# Patient Record
Sex: MALE | Race: WHITE | NOT HISPANIC OR LATINO | URBAN - METROPOLITAN AREA
[De-identification: names, ages, dates, MRNs, and addresses within clinical notes are randomized per-mention and may not be internally consistent; named-entity substitution may affect disease eponyms.]

---

## 2020-07-25 ENCOUNTER — TELEPHONE ENCOUNTER (OUTPATIENT)
Dept: URBAN - METROPOLITAN AREA CLINIC 13 | Facility: CLINIC | Age: 60
End: 2020-07-25

## 2020-07-26 ENCOUNTER — TELEPHONE ENCOUNTER (OUTPATIENT)
Dept: URBAN - METROPOLITAN AREA CLINIC 13 | Facility: CLINIC | Age: 60
End: 2020-07-26

## 2020-07-26 RX ORDER — MULTIVITAMIN
TAKE 1 TABLET DAILY TABLET ORAL
Refills: 0 | Status: ACTIVE | COMMUNITY

## 2020-07-26 RX ORDER — TAMSULOSIN HYDROCHLORIDE 0.4 MG/1
TAKE 1 CAPSULE DAILY CAPSULE ORAL
Refills: 0 | Status: ACTIVE | COMMUNITY

## 2020-07-26 RX ORDER — LISINOPRIL 20 MG/1
TAKE 1 TABLET DAILY TABLET ORAL
Refills: 0 | Status: ACTIVE | COMMUNITY

## 2020-07-26 RX ORDER — CARVEDILOL 6.25 MG/1
1/2 TABLET TWICE DAILY TABLET, FILM COATED ORAL
Refills: 0 | Status: ACTIVE | COMMUNITY

## 2020-07-26 RX ORDER — SPIRONOLACTONE 25 MG/1
TAKE 1 TABLET DAILY TABLET ORAL
Refills: 0 | Status: ACTIVE | COMMUNITY

## 2020-11-23 ENCOUNTER — TELEPHONE ENCOUNTER (OUTPATIENT)
Dept: URBAN - METROPOLITAN AREA CLINIC 113 | Facility: CLINIC | Age: 60
End: 2020-11-23

## 2020-11-23 ENCOUNTER — LAB OUTSIDE AN ENCOUNTER (OUTPATIENT)
Dept: URBAN - METROPOLITAN AREA CLINIC 113 | Facility: CLINIC | Age: 60
End: 2020-11-23

## 2020-11-23 ENCOUNTER — CLAIMS CREATED FROM THE CLAIM WINDOW (OUTPATIENT)
Dept: URBAN - METROPOLITAN AREA MEDICAL CENTER 2 | Facility: MEDICAL CENTER | Age: 60
End: 2020-11-23
Payer: COMMERCIAL

## 2020-11-23 DIAGNOSIS — K92.1 MELENA: ICD-10-CM

## 2020-11-23 DIAGNOSIS — K26.9 DUODENAL ULCER: ICD-10-CM

## 2020-11-23 DIAGNOSIS — D50.0 IRON DEFICIENCY ANEMIA SECONDARY TO BLOOD LOSS (CHRONIC): ICD-10-CM

## 2020-11-23 DIAGNOSIS — K29.50 CHRONIC GASTRITIS: ICD-10-CM

## 2020-11-23 PROCEDURE — 99253 IP/OBS CNSLTJ NEW/EST LOW 45: CPT | Performed by: INTERNAL MEDICINE

## 2020-11-23 PROCEDURE — 43239 EGD BIOPSY SINGLE/MULTIPLE: CPT | Performed by: INTERNAL MEDICINE

## 2020-11-24 ENCOUNTER — CLAIMS CREATED FROM THE CLAIM WINDOW (OUTPATIENT)
Dept: URBAN - METROPOLITAN AREA MEDICAL CENTER 2 | Facility: MEDICAL CENTER | Age: 60
End: 2020-11-24
Payer: COMMERCIAL

## 2020-11-24 DIAGNOSIS — D50.0 IRON DEFICIENCY ANEMIA SECONDARY TO BLOOD LOSS (CHRONIC): ICD-10-CM

## 2020-11-24 DIAGNOSIS — K92.1 MELENA: ICD-10-CM

## 2020-11-24 DIAGNOSIS — K25.9 ANTRAL ULCER: ICD-10-CM

## 2020-11-24 PROCEDURE — 99232 SBSQ HOSP IP/OBS MODERATE 35: CPT | Performed by: INTERNAL MEDICINE

## 2020-12-09 ENCOUNTER — CLAIMS CREATED FROM THE CLAIM WINDOW (OUTPATIENT)
Dept: URBAN - METROPOLITAN AREA MEDICAL CENTER 2 | Facility: MEDICAL CENTER | Age: 60
End: 2020-12-09
Payer: COMMERCIAL

## 2020-12-09 DIAGNOSIS — R11.2 NAUSEA WITH VOMITING, UNSPECIFIED: ICD-10-CM

## 2020-12-09 DIAGNOSIS — K25.9 GASTRIC ULCER, UNSPECIFIED AS ACUTE OR CHRONIC, WITHOUT HEMORRHAGE OR PERFORATION: ICD-10-CM

## 2020-12-09 DIAGNOSIS — R19.7 DIARRHEA, UNSPECIFIED: ICD-10-CM

## 2020-12-09 PROCEDURE — 99253 IP/OBS CNSLTJ NEW/EST LOW 45: CPT | Performed by: INTERNAL MEDICINE

## 2020-12-10 ENCOUNTER — CLAIMS CREATED FROM THE CLAIM WINDOW (OUTPATIENT)
Dept: URBAN - METROPOLITAN AREA MEDICAL CENTER 2 | Facility: MEDICAL CENTER | Age: 60
End: 2020-12-10
Payer: COMMERCIAL

## 2020-12-10 DIAGNOSIS — R11.2 NAUSEA WITH VOMITING, UNSPECIFIED: ICD-10-CM

## 2020-12-10 DIAGNOSIS — K52.9 NONINFECTIVE GASTROENTERITIS AND COLITIS, UNSPECIFIED: ICD-10-CM

## 2020-12-10 DIAGNOSIS — K25.9 GASTRIC ULCER, UNSPECIFIED AS ACUTE OR CHRONIC, WITHOUT HEMORRHAGE OR PERFORATION: ICD-10-CM

## 2020-12-10 DIAGNOSIS — R19.7 DIARRHEA, UNSPECIFIED: ICD-10-CM

## 2020-12-10 PROCEDURE — 99232 SBSQ HOSP IP/OBS MODERATE 35: CPT | Performed by: INTERNAL MEDICINE

## 2020-12-11 ENCOUNTER — CLAIMS CREATED FROM THE CLAIM WINDOW (OUTPATIENT)
Dept: URBAN - METROPOLITAN AREA MEDICAL CENTER 2 | Facility: MEDICAL CENTER | Age: 60
End: 2020-12-11
Payer: COMMERCIAL

## 2020-12-11 ENCOUNTER — TELEPHONE ENCOUNTER (OUTPATIENT)
Dept: URBAN - METROPOLITAN AREA CLINIC 113 | Facility: CLINIC | Age: 60
End: 2020-12-11

## 2020-12-11 DIAGNOSIS — R11.2 NAUSEA WITH VOMITING, UNSPECIFIED: ICD-10-CM

## 2020-12-11 DIAGNOSIS — K52.9 NONINFECTIVE GASTROENTERITIS AND COLITIS, UNSPECIFIED: ICD-10-CM

## 2020-12-11 DIAGNOSIS — R19.7 DIARRHEA, UNSPECIFIED: ICD-10-CM

## 2020-12-11 DIAGNOSIS — K25.9 GASTRIC ULCER, UNSPECIFIED AS ACUTE OR CHRONIC, WITHOUT HEMORRHAGE OR PERFORATION: ICD-10-CM

## 2020-12-11 PROCEDURE — 99232 SBSQ HOSP IP/OBS MODERATE 35: CPT | Performed by: INTERNAL MEDICINE

## 2020-12-21 ENCOUNTER — OFFICE VISIT (OUTPATIENT)
Dept: URBAN - METROPOLITAN AREA CLINIC 113 | Facility: CLINIC | Age: 60
End: 2020-12-21

## 2020-12-21 RX ORDER — LISINOPRIL 20 MG/1
TAKE 1 TABLET DAILY TABLET ORAL
Refills: 0 | Status: ACTIVE | COMMUNITY

## 2020-12-21 RX ORDER — CARVEDILOL 6.25 MG/1
1/2 TABLET TWICE DAILY TABLET, FILM COATED ORAL
Refills: 0 | Status: ACTIVE | COMMUNITY

## 2020-12-21 RX ORDER — TAMSULOSIN HYDROCHLORIDE 0.4 MG/1
TAKE 1 CAPSULE DAILY CAPSULE ORAL
Refills: 0 | Status: ACTIVE | COMMUNITY

## 2020-12-21 RX ORDER — SPIRONOLACTONE 25 MG/1
TAKE 1 TABLET DAILY TABLET ORAL
Refills: 0 | Status: ACTIVE | COMMUNITY

## 2020-12-21 RX ORDER — MULTIVITAMIN
TAKE 1 TABLET DAILY TABLET ORAL
Refills: 0 | Status: ACTIVE | COMMUNITY

## 2020-12-24 ENCOUNTER — CLAIMS CREATED FROM THE CLAIM WINDOW (OUTPATIENT)
Dept: URBAN - METROPOLITAN AREA MEDICAL CENTER 2 | Facility: MEDICAL CENTER | Age: 60
End: 2020-12-24
Payer: COMMERCIAL

## 2020-12-24 DIAGNOSIS — K57.33 DIVERTICULITIS OF LARGE INTESTINE WITHOUT PERFORATION OR ABSCESS WITH BLEEDING: ICD-10-CM

## 2020-12-24 PROCEDURE — 99254 IP/OBS CNSLTJ NEW/EST MOD 60: CPT | Performed by: INTERNAL MEDICINE

## 2020-12-25 ENCOUNTER — CLAIMS CREATED FROM THE CLAIM WINDOW (OUTPATIENT)
Dept: URBAN - METROPOLITAN AREA MEDICAL CENTER 2 | Facility: MEDICAL CENTER | Age: 60
End: 2020-12-25
Payer: COMMERCIAL

## 2020-12-25 DIAGNOSIS — K57.33 DIVERTICULITIS OF LARGE INTESTINE WITHOUT PERFORATION OR ABSCESS WITH BLEEDING: ICD-10-CM

## 2020-12-25 PROCEDURE — 99232 SBSQ HOSP IP/OBS MODERATE 35: CPT | Performed by: INTERNAL MEDICINE

## 2020-12-28 ENCOUNTER — WEB ENCOUNTER (OUTPATIENT)
Dept: URBAN - METROPOLITAN AREA CLINIC 113 | Facility: CLINIC | Age: 60
End: 2020-12-28

## 2020-12-28 ENCOUNTER — OFFICE VISIT (OUTPATIENT)
Dept: URBAN - METROPOLITAN AREA CLINIC 113 | Facility: CLINIC | Age: 60
End: 2020-12-28
Payer: COMMERCIAL

## 2020-12-28 VITALS
SYSTOLIC BLOOD PRESSURE: 130 MMHG | HEART RATE: 74 BPM | HEIGHT: 73 IN | TEMPERATURE: 99.1 F | DIASTOLIC BLOOD PRESSURE: 77 MMHG | BODY MASS INDEX: 32.34 KG/M2 | WEIGHT: 244 LBS

## 2020-12-28 DIAGNOSIS — R10.32 LEFT LOWER QUADRANT ABDOMINAL PAIN: ICD-10-CM

## 2020-12-28 DIAGNOSIS — K25.3 ACUTE GASTRIC ULCER WITHOUT HEMORRHAGE OR PERFORATION: ICD-10-CM

## 2020-12-28 DIAGNOSIS — K57.32 DIVERTICULITIS OF LARGE INTESTINE, UNSPECIFIED BLEEDING STATUS, UNSPECIFIED COMPLICATION STATUS: ICD-10-CM

## 2020-12-28 DIAGNOSIS — R93.5 ABNORMAL CT OF THE ABDOMEN: ICD-10-CM

## 2020-12-28 PROCEDURE — 99214 OFFICE O/P EST MOD 30 MIN: CPT | Performed by: NURSE PRACTITIONER

## 2020-12-28 PROCEDURE — G8427 DOCREV CUR MEDS BY ELIG CLIN: HCPCS | Performed by: NURSE PRACTITIONER

## 2020-12-28 PROCEDURE — G9903 PT SCRN TBCO ID AS NON USER: HCPCS | Performed by: NURSE PRACTITIONER

## 2020-12-28 PROCEDURE — G8482 FLU IMMUNIZE ORDER/ADMIN: HCPCS | Performed by: NURSE PRACTITIONER

## 2020-12-28 RX ORDER — CIPROFLOXACIN 500 MG/5ML
5 ML KIT ORAL ONCE DAILY
Status: ACTIVE | COMMUNITY

## 2020-12-28 RX ORDER — ROPINIROLE 1 MG/1
1 TABLET TABLET, FILM COATED ORAL ONCE A DAY
Status: ACTIVE | COMMUNITY

## 2020-12-28 RX ORDER — PANTOPRAZOLE SODIUM 40 MG/1
1 TABLET TABLET, DELAYED RELEASE ORAL TWICE A DAY
Status: ACTIVE | COMMUNITY

## 2020-12-28 RX ORDER — CARIPRAZINE 1.5 MG/1
1 CAPSULE CAPSULE, GELATIN COATED ORAL ONCE A DAY
Status: ACTIVE | COMMUNITY

## 2020-12-28 RX ORDER — AMLODIPINE BESYLATE 5 MG/1
1 TABLET TABLET ORAL ONCE A DAY
Status: ACTIVE | COMMUNITY

## 2020-12-28 RX ORDER — LISINOPRIL 20 MG/1
TAKE 1 TABLET DAILY TABLET ORAL
Refills: 0 | Status: ACTIVE | COMMUNITY

## 2020-12-28 RX ORDER — CARVEDILOL 6.25 MG/1
1/2 TABLET TWICE DAILY TABLET, FILM COATED ORAL
Refills: 0 | Status: ACTIVE | COMMUNITY

## 2020-12-28 RX ORDER — BUSPIRONE HYDROCHLORIDE 7.5 MG/1
1 TABLET TABLET ORAL TWICE A DAY
Status: ACTIVE | COMMUNITY

## 2020-12-28 RX ORDER — AMOXICILLIN 875 MG/1
1 TABLET TABLET, FILM COATED ORAL ONCE DAILY
Status: ACTIVE | COMMUNITY

## 2020-12-28 RX ORDER — ROPINIROLE 2 MG/1
1 TABLET TABLET, FILM COATED ORAL ONCE A DAY
Status: ACTIVE | COMMUNITY

## 2020-12-28 RX ORDER — METRONIDAZOLE 500 MG/1
1 TABLET TABLET, FILM COATED ORAL ONCE DAILY
Status: ACTIVE | COMMUNITY

## 2020-12-28 NOTE — HPI-OTHER HISTORIES
Labs 12/10/2020 BMP normal with exception of BUN 28, glucose 133, calcium 8.2.  LFTs: TB 0.8, ALP 37, ALT 19, AST 33.  CBC: WBC 5.78, hemoglobin 11.5, MCV 97, platelet 144.  12/9/2020 CBC: WBC 8.8, hemoglobin 13.6, MCV 94.8, platelet 137.  BMP normal with exception of BUN 26, glucose 126.  LFTs normal.  Lipase 27.  Stool for C. difficile negative. CT of the abdomen and pelvis with contrast 12/9/2020 : Moderate diffuse thickening of the colon suggesting colitis; endoscopic correlation if indicated.  Mild perihepatic free fluid.  Hepatic steatosis, and cholelithiasis.  Mild prostatomegaly.  Lumbar spondylosis.   EGD 11/23/2020: Multiple nonbleeding 2 to 6 mm cratered antral and prepyloric gastric ulcers with no stigmata of bleeding status post biopsy, moderate erosive D1 and D2 duodenitis, small amount of retained gastric contents consistent with gastroparesis, EGD otherwise normal to D3.  Pathology: Gastric antral biopsies demonstrated antral gastric mucosa showing sparse chronic inflammation.  Negative for H. pylori.   Colonoscopy 3/20/2018: BBPS 6, sigmoid/descending/transverse diverticulosis, otherwise normal.  Screening recommended in 2028.

## 2020-12-28 NOTE — PHYSICAL EXAM GASTROINTESTINAL
Abdomen , soft, tender LLQ, nondistended , no guarding or rigidity , no masses palpable , normal bowel sounds , Liver and Spleen , no hepatosplenomegaly

## 2020-12-28 NOTE — HPI-TODAY'S VISIT:
This is a 60-year-old male with a history of nonischemic cardiomyopathy status post defibrillator placement, sleep apnea, atrial fibrillation, depression/anxiety, hypertension, and degenerative joint disease presenting for hospital follow-up.  He was seen in hospital consultation 12/9/2020 for diarrhea.  He had been seen 2 weeks earlier in hospital consultation with melena and had undergone an EGD demonstrating gastric ulcers.  He had been prescribed PPI.  He had done well until the day before admission when he developed acute onset of profound diarrhea with severe nausea and vomiting.  Labs demonstrated a slightly low platelet count, normal hemoglobin.  Stool for C. difficile was pending.  It was discussed that he likely had a foodborne illness.  Stool for C. difficile was negative.  CT was notable for diffusely thickened colon suggesting colitis. He reports bright red blood per rectum for which he was readmitted to the hospital 12/20/2020.  He was seen by Dr. Hsu.  He was diagnosed with diverticulitis by CT scan.  He was given IV antibiotics and discharged from the hospital 12/25 (records from this hospitalization are not available).  When he was discharged from the hospital on 12/13 after the episode of colitis, he had been taking Cipro and Flagyl.  He resumed this following hospitalization and is also been taking Augmentin 875 mg twice daily.  These medications were initially prescribed for 10 days.  He has 5 or 6 days left of Cipro.  He is having 2 or 3 small bowel movements per day which is his baseline.  He reports slight, intermittent residual pain in the left lower quadrant.  He denies any other abdominal symptoms.  He reports a poor appetite.  He continues to experience an intermittent low-grade temperature.  He is complaining of fatigue and lack of energy.  He denies dyspnea or chest pain.  He has follow-up with his cardiologist on 1/4/2020.  He is compliant with twice daily pantoprazole.

## 2021-01-06 ENCOUNTER — TELEPHONE ENCOUNTER (OUTPATIENT)
Dept: URBAN - METROPOLITAN AREA CLINIC 113 | Facility: CLINIC | Age: 61
End: 2021-01-06

## 2021-01-11 ENCOUNTER — TELEPHONE ENCOUNTER (OUTPATIENT)
Dept: URBAN - METROPOLITAN AREA CLINIC 113 | Facility: CLINIC | Age: 61
End: 2021-01-11

## 2021-01-14 ENCOUNTER — OFFICE VISIT (OUTPATIENT)
Dept: URBAN - METROPOLITAN AREA MEDICAL CENTER 2 | Facility: MEDICAL CENTER | Age: 61
End: 2021-01-14
Payer: COMMERCIAL

## 2021-01-14 DIAGNOSIS — K29.50 ANTRAL GASTRITIS: ICD-10-CM

## 2021-01-14 DIAGNOSIS — K25.3 ACUTE BENIGN GASTRIC ULCER: ICD-10-CM

## 2021-01-14 PROCEDURE — 43239 EGD BIOPSY SINGLE/MULTIPLE: CPT | Performed by: INTERNAL MEDICINE

## 2021-01-14 RX ORDER — ROPINIROLE 2 MG/1
1 TABLET TABLET, FILM COATED ORAL ONCE A DAY
Status: ACTIVE | COMMUNITY

## 2021-01-14 RX ORDER — PANTOPRAZOLE SODIUM 40 MG/1
1 TABLET TABLET, DELAYED RELEASE ORAL TWICE A DAY
Status: ACTIVE | COMMUNITY

## 2021-01-14 RX ORDER — AMOXICILLIN 875 MG/1
1 TABLET TABLET, FILM COATED ORAL ONCE DAILY
Status: ACTIVE | COMMUNITY

## 2021-01-14 RX ORDER — BUSPIRONE HYDROCHLORIDE 7.5 MG/1
1 TABLET TABLET ORAL TWICE A DAY
Status: ACTIVE | COMMUNITY

## 2021-01-14 RX ORDER — METRONIDAZOLE 500 MG/1
1 TABLET TABLET, FILM COATED ORAL ONCE DAILY
Status: ACTIVE | COMMUNITY

## 2021-01-14 RX ORDER — LISINOPRIL 20 MG/1
TAKE 1 TABLET DAILY TABLET ORAL
Refills: 0 | Status: ACTIVE | COMMUNITY

## 2021-01-14 RX ORDER — CIPROFLOXACIN 500 MG/5ML
5 ML KIT ORAL ONCE DAILY
Status: ACTIVE | COMMUNITY

## 2021-01-14 RX ORDER — CARIPRAZINE 1.5 MG/1
1 CAPSULE CAPSULE, GELATIN COATED ORAL ONCE A DAY
Status: ACTIVE | COMMUNITY

## 2021-01-14 RX ORDER — CARVEDILOL 6.25 MG/1
1/2 TABLET TWICE DAILY TABLET, FILM COATED ORAL
Refills: 0 | Status: ACTIVE | COMMUNITY

## 2021-01-14 RX ORDER — AMLODIPINE BESYLATE 5 MG/1
1 TABLET TABLET ORAL ONCE A DAY
Status: ACTIVE | COMMUNITY

## 2021-01-14 RX ORDER — ROPINIROLE 1 MG/1
1 TABLET TABLET, FILM COATED ORAL ONCE A DAY
Status: ACTIVE | COMMUNITY

## 2021-01-21 ENCOUNTER — TELEPHONE ENCOUNTER (OUTPATIENT)
Dept: URBAN - METROPOLITAN AREA CLINIC 113 | Facility: CLINIC | Age: 61
End: 2021-01-21

## 2021-01-21 ENCOUNTER — LAB OUTSIDE AN ENCOUNTER (OUTPATIENT)
Dept: URBAN - METROPOLITAN AREA CLINIC 113 | Facility: CLINIC | Age: 61
End: 2021-01-21

## 2021-01-21 RX ORDER — SODIUM, POTASSIUM,MAG SULFATES 17.5-3.13G
354ML SOLUTION, RECONSTITUTED, ORAL ORAL
Qty: 354 MILLILITER | Refills: 0 | OUTPATIENT
Start: 2021-01-21 | End: 2021-01-22

## 2021-01-22 ENCOUNTER — OFFICE VISIT (OUTPATIENT)
Dept: URBAN - METROPOLITAN AREA CLINIC 113 | Facility: CLINIC | Age: 61
End: 2021-01-22

## 2021-01-28 ENCOUNTER — TELEPHONE ENCOUNTER (OUTPATIENT)
Dept: URBAN - METROPOLITAN AREA CLINIC 113 | Facility: CLINIC | Age: 61
End: 2021-01-28

## 2021-01-29 ENCOUNTER — OFFICE VISIT (OUTPATIENT)
Dept: URBAN - METROPOLITAN AREA CLINIC 113 | Facility: CLINIC | Age: 61
End: 2021-01-29

## 2021-02-01 ENCOUNTER — OFFICE VISIT (OUTPATIENT)
Dept: URBAN - METROPOLITAN AREA MEDICAL CENTER 2 | Facility: MEDICAL CENTER | Age: 61
End: 2021-02-01
Payer: COMMERCIAL

## 2021-02-01 ENCOUNTER — OFFICE VISIT (OUTPATIENT)
Dept: URBAN - METROPOLITAN AREA CLINIC 113 | Facility: CLINIC | Age: 61
End: 2021-02-01

## 2021-02-01 DIAGNOSIS — K57.32 DIVERTICULITIS LARGE INTESTINE: ICD-10-CM

## 2021-02-01 DIAGNOSIS — K63.3 APHTHOUS ULCER OF COLON: ICD-10-CM

## 2021-02-01 DIAGNOSIS — K57.30 ACQUIRED DIVERTICULOSIS OF COLON: ICD-10-CM

## 2021-02-01 DIAGNOSIS — K62.5 ANAL BLEEDING: ICD-10-CM

## 2021-02-01 PROCEDURE — 45380 COLONOSCOPY AND BIOPSY: CPT | Performed by: INTERNAL MEDICINE

## 2021-02-01 RX ORDER — METRONIDAZOLE 500 MG/1
1 TABLET TABLET, FILM COATED ORAL ONCE DAILY
Status: ACTIVE | COMMUNITY

## 2021-02-01 RX ORDER — CIPROFLOXACIN 500 MG/5ML
5 ML KIT ORAL ONCE DAILY
Status: ACTIVE | COMMUNITY

## 2021-02-01 RX ORDER — CARIPRAZINE 1.5 MG/1
1 CAPSULE CAPSULE, GELATIN COATED ORAL ONCE A DAY
Status: ACTIVE | COMMUNITY

## 2021-02-01 RX ORDER — AMOXICILLIN 875 MG/1
1 TABLET TABLET, FILM COATED ORAL ONCE DAILY
Status: ACTIVE | COMMUNITY

## 2021-02-01 RX ORDER — CARVEDILOL 6.25 MG/1
1/2 TABLET TWICE DAILY TABLET, FILM COATED ORAL
Refills: 0 | Status: ACTIVE | COMMUNITY

## 2021-02-01 RX ORDER — ROPINIROLE 2 MG/1
1 TABLET TABLET, FILM COATED ORAL ONCE A DAY
Status: ACTIVE | COMMUNITY

## 2021-02-01 RX ORDER — ROPINIROLE 1 MG/1
1 TABLET TABLET, FILM COATED ORAL ONCE A DAY
Status: ACTIVE | COMMUNITY

## 2021-02-01 RX ORDER — BUSPIRONE HYDROCHLORIDE 7.5 MG/1
1 TABLET TABLET ORAL TWICE A DAY
Status: ACTIVE | COMMUNITY

## 2021-02-01 RX ORDER — LISINOPRIL 20 MG/1
TAKE 1 TABLET DAILY TABLET ORAL
Refills: 0 | Status: ACTIVE | COMMUNITY

## 2021-02-01 RX ORDER — PANTOPRAZOLE SODIUM 40 MG/1
1 TABLET TABLET, DELAYED RELEASE ORAL TWICE A DAY
Status: ACTIVE | COMMUNITY

## 2021-02-01 RX ORDER — AMLODIPINE BESYLATE 5 MG/1
1 TABLET TABLET ORAL ONCE A DAY
Status: ACTIVE | COMMUNITY

## 2021-02-22 ENCOUNTER — OFFICE VISIT (OUTPATIENT)
Dept: URBAN - METROPOLITAN AREA CLINIC 107 | Facility: CLINIC | Age: 61
End: 2021-02-22

## 2021-02-22 ENCOUNTER — WEB ENCOUNTER (OUTPATIENT)
Dept: URBAN - METROPOLITAN AREA CLINIC 107 | Facility: CLINIC | Age: 61
End: 2021-02-22

## 2021-02-22 ENCOUNTER — DASHBOARD ENCOUNTERS (OUTPATIENT)
Age: 61
End: 2021-02-22

## 2021-02-22 ENCOUNTER — OFFICE VISIT (OUTPATIENT)
Dept: URBAN - METROPOLITAN AREA CLINIC 107 | Facility: CLINIC | Age: 61
End: 2021-02-22
Payer: COMMERCIAL

## 2021-02-22 VITALS
HEART RATE: 70 BPM | DIASTOLIC BLOOD PRESSURE: 73 MMHG | SYSTOLIC BLOOD PRESSURE: 134 MMHG | TEMPERATURE: 98.2 F | BODY MASS INDEX: 33.27 KG/M2 | RESPIRATION RATE: 18 BRPM | HEIGHT: 73 IN | WEIGHT: 251 LBS

## 2021-02-22 DIAGNOSIS — K25.3 ACUTE GASTRIC ULCER WITHOUT HEMORRHAGE OR PERFORATION: ICD-10-CM

## 2021-02-22 DIAGNOSIS — K57.32 DIVERTICULITIS OF LARGE INTESTINE, UNSPECIFIED BLEEDING STATUS, UNSPECIFIED COMPLICATION STATUS: ICD-10-CM

## 2021-02-22 DIAGNOSIS — K25.7 CHRONIC GASTRIC ULCER WITHOUT HEMORRHAGE AND WITHOUT PERFORATION: ICD-10-CM

## 2021-02-22 DIAGNOSIS — R10.32 LEFT LOWER QUADRANT ABDOMINAL PAIN: ICD-10-CM

## 2021-02-22 DIAGNOSIS — R93.5 ABNORMAL CT OF THE ABDOMEN: ICD-10-CM

## 2021-02-22 DIAGNOSIS — K57.92 DIVERTICULITIS: ICD-10-CM

## 2021-02-22 PROBLEM — 15634181000119107: Status: ACTIVE | Noted: 2020-12-28

## 2021-02-22 PROBLEM — 67964002: Status: ACTIVE | Noted: 2020-12-28

## 2021-02-22 PROBLEM — 4494009: Status: ACTIVE | Noted: 2020-12-28

## 2021-02-22 PROBLEM — 307496006 DIVERTICULITIS: Status: ACTIVE | Noted: 2021-01-21

## 2021-02-22 PROBLEM — 76796008: Status: ACTIVE | Noted: 2020-11-23

## 2021-02-22 PROBLEM — 301716002: Status: ACTIVE | Noted: 2020-12-28

## 2021-02-22 PROCEDURE — 99213 OFFICE O/P EST LOW 20 MIN: CPT | Performed by: NURSE PRACTITIONER

## 2021-02-22 RX ORDER — BUSPIRONE HYDROCHLORIDE 7.5 MG/1
1 TABLET TABLET ORAL TWICE A DAY
Status: ACTIVE | COMMUNITY

## 2021-02-22 RX ORDER — LISINOPRIL 20 MG/1
TAKE 1 TABLET DAILY TABLET ORAL
Refills: 0 | Status: ACTIVE | COMMUNITY

## 2021-02-22 RX ORDER — CARVEDILOL 6.25 MG/1
1/2 TABLET TWICE DAILY TABLET, FILM COATED ORAL
Refills: 0 | Status: ACTIVE | COMMUNITY

## 2021-02-22 RX ORDER — METRONIDAZOLE 500 MG/1
1 TABLET TABLET, FILM COATED ORAL ONCE DAILY
Status: ACTIVE | COMMUNITY

## 2021-02-22 RX ORDER — AMLODIPINE BESYLATE 5 MG/1
1 TABLET TABLET ORAL ONCE A DAY
Status: ACTIVE | COMMUNITY

## 2021-02-22 RX ORDER — PANTOPRAZOLE SODIUM 40 MG/1
1 TABLET TABLET, DELAYED RELEASE ORAL TWICE A DAY
OUTPATIENT

## 2021-02-22 RX ORDER — AMOXICILLIN 875 MG/1
1 TABLET TABLET, FILM COATED ORAL ONCE DAILY
Status: ACTIVE | COMMUNITY

## 2021-02-22 RX ORDER — ROPINIROLE 1 MG/1
1 TABLET TABLET, FILM COATED ORAL ONCE A DAY
Status: ACTIVE | COMMUNITY

## 2021-02-22 RX ORDER — CIPROFLOXACIN 500 MG/5ML
5 ML KIT ORAL ONCE DAILY
Status: DISCONTINUED | COMMUNITY

## 2021-02-22 RX ORDER — ROPINIROLE 2 MG/1
1 TABLET TABLET, FILM COATED ORAL ONCE A DAY
Status: ACTIVE | COMMUNITY

## 2021-02-22 RX ORDER — CARIPRAZINE 1.5 MG/1
1 CAPSULE CAPSULE, GELATIN COATED ORAL ONCE A DAY
Status: ACTIVE | COMMUNITY

## 2021-02-22 RX ORDER — AMOXICILLIN 875 MG/1
1 TABLET TABLET, FILM COATED ORAL ONCE DAILY
Status: ON HOLD | COMMUNITY

## 2021-02-22 RX ORDER — METRONIDAZOLE 500 MG/1
1 TABLET TABLET, FILM COATED ORAL ONCE DAILY
Status: DISCONTINUED | COMMUNITY

## 2021-02-22 RX ORDER — PANTOPRAZOLE SODIUM 40 MG/1
1 TABLET TABLET, DELAYED RELEASE ORAL TWICE A DAY
Status: ACTIVE | COMMUNITY

## 2021-02-22 RX ORDER — CIPROFLOXACIN 500 MG/5ML
5 ML KIT ORAL ONCE DAILY
Status: ACTIVE | COMMUNITY

## 2021-02-22 NOTE — HPI-TODAY'S VISIT:
60-year-old male with a history of nonischemic cardiomyopathy status post defibrillator placement, sleep apnea, atrial fibrillation, depression/anxiety, hypertension, and degenerative joint disease, presenting for follow-up after colonoscopy. He was last seen in the office on 12/28/2020 by Yelena House NP, following hospitalization for diarrhea.  He had been seen in the hospital 2 weeks prior to the diarrhea encounter with melena, and underwent EGD demonstrating gastric ulcers.  He was admitted on 12/9/2020 with onset of acute profound diarrhea, likely secondary to foodborne illness.  CT was notable for diffusely thickened colon suggesting colitis.  He reported bright red blood per rectum and was readmitted to the hospital on 12/20/2020.  He was diagnosed with diverticulitis by CT scan, treated with IV antibiotics and discharged on oral Cipro, Flagyl and Augmentin.  He was tender on examination at that visit, and was recommended a CBC to assess for evidence of leukocytosis and anemia, as well as repeat CT abdomen and pelvis to assess for complications of diverticulitis.  He was recommended to stop Cipro and Flagyl, and continue Augmentin alone. He underwent EGD on 1/14/2021 to assess for healing of prior gastric ulcers.  He was noted to have a normal esophagus, gastritis status post biopsies and normal examined duodenum.  Pathology revealed chronic gastritis negative for infection or precancerous changes. He underwent colonoscopy on 2/1/2021.  Findings included good bowel preparation with [place] bowel preparation scale score of 6.  Moderate diverticulosis throughout the entire examined colon with narrowing of the colon in association with diverticular opening.  There was granular and nodular mucosa noted in the proximal sigmoid colon, which was biopsied.  Biopsies were negative for infectious or precancerous changes.

## 2021-02-22 NOTE — HPI-TODAY'S VISIT:
60-year-old male with a history of nonischemic cardiomyopathy status post defibrillator placement, sleep apnea, atrial fibrillation, depression/anxiety, hypertension, and degenerative joint disease, presenting for follow-up after colonoscopy. He was last seen in the office on 12/28/2020 by Yelena House NP, following hospitalization for diarrhea.  He had been seen in the hospital 2 weeks prior to the diarrhea encounter with melena, and underwent EGD demonstrating gastric ulcers.  He was admitted on 12/9/2020 with onset of acute profound diarrhea, likely secondary to foodborne illness.  CT was notable for diffusely thickened colon suggesting colitis.  He reported bright red blood per rectum and was readmitted to the hospital on 12/20/2020.  He was diagnosed with diverticulitis by CT scan, treated with IV antibiotics and discharged on oral Cipro, Flagyl and Augmentin.  He was tender on examination at that visit, and was recommended a CBC to assess for evidence of leukocytosis and anemia, as well as repeat CT abdomen and pelvis to assess for complications of diverticulitis.  He was recommended to stop Cipro and Flagyl, and continue Augmentin alone. He underwent EGD on 1/14/2021 to assess for healing of prior gastric ulcers.  He was noted to have a normal esophagus, gastritis status post biopsies and normal examined duodenum.  Pathology revealed chronic gastritis negative for infection or precancerous changes. He underwent colonoscopy on 2/1/2021.  Findings included good bowel preparation with [place] bowel preparation scale score of 6.  Moderate diverticulosis throughout the entire examined colon with narrowing of the colon in association with diverticular opening.  There was granular and nodular mucosa noted in the proximal sigmoid colon, which was biopsied.  Biopsies were negative for infectious or precancerous changes.  He reports that he is feeling well. He has occasional abdominal discomfort with dietary indiscretion. Spocy foods tend to be trigger foods. Weight is stable. Appetite is fair. No nausea or vomiting. He has bowel movements daily. No dysphagia.